# Patient Record
Sex: FEMALE | Race: WHITE | NOT HISPANIC OR LATINO | Employment: UNEMPLOYED | URBAN - METROPOLITAN AREA
[De-identification: names, ages, dates, MRNs, and addresses within clinical notes are randomized per-mention and may not be internally consistent; named-entity substitution may affect disease eponyms.]

---
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
## 2022-10-24 ENCOUNTER — OFFICE VISIT (OUTPATIENT)
Dept: BARIATRICS/WEIGHT MGMT | Facility: CLINIC | Age: 22
End: 2022-10-24

## 2022-10-24 VITALS
WEIGHT: 232 LBS | OXYGEN SATURATION: 98 % | RESPIRATION RATE: 18 BRPM | HEIGHT: 69 IN | BODY MASS INDEX: 34.36 KG/M2 | DIASTOLIC BLOOD PRESSURE: 90 MMHG | TEMPERATURE: 98.2 F | HEART RATE: 99 BPM | SYSTOLIC BLOOD PRESSURE: 136 MMHG

## 2022-10-24 DIAGNOSIS — R53.83 OTHER FATIGUE: ICD-10-CM

## 2022-10-24 DIAGNOSIS — F41.1 GENERALIZED ANXIETY DISORDER: ICD-10-CM

## 2022-10-24 DIAGNOSIS — F84.0 AUTISM SPECTRUM DISORDER: ICD-10-CM

## 2022-10-24 DIAGNOSIS — R03.0 ELEVATED BLOOD PRESSURE READING: ICD-10-CM

## 2022-10-24 DIAGNOSIS — E66.09 CLASS 1 OBESITY DUE TO EXCESS CALORIES WITHOUT SERIOUS COMORBIDITY WITH BODY MASS INDEX (BMI) OF 34.0 TO 34.9 IN ADULT: Primary | ICD-10-CM

## 2022-10-24 DIAGNOSIS — Z51.81 ENCOUNTER FOR THERAPEUTIC DRUG LEVEL MONITORING: ICD-10-CM

## 2022-10-24 DIAGNOSIS — F31.81 BIPOLAR II DISORDER: ICD-10-CM

## 2022-10-24 PROBLEM — F32.81 PMDD (PREMENSTRUAL DYSPHORIC DISORDER): Status: ACTIVE | Noted: 2021-10-19

## 2022-10-24 PROCEDURE — 99205 OFFICE O/P NEW HI 60 MIN: CPT | Performed by: NURSE PRACTITIONER

## 2022-10-24 RX ORDER — CHLORAL HYDRATE 500 MG
1000 CAPSULE ORAL 2 TIMES DAILY WITH MEALS
Start: 2022-10-24

## 2022-10-24 RX ORDER — MULTIPLE VITAMINS W/ MINERALS TAB 9MG-400MCG
1 TAB ORAL DAILY
Qty: 30 TABLET | Refills: 2
Start: 2022-10-24

## 2022-10-24 RX ORDER — LAMOTRIGINE 200 MG/1
200 TABLET ORAL
COMMUNITY
Start: 2022-06-07 | End: 2022-10-24 | Stop reason: SDUPTHER

## 2022-10-24 RX ORDER — BUSPIRONE HYDROCHLORIDE 15 MG/1
15 TABLET ORAL
COMMUNITY
Start: 2022-06-07 | End: 2022-11-07 | Stop reason: SDUPTHER

## 2022-10-24 NOTE — ASSESSMENT & PLAN NOTE
Patient's (Body mass index is 34.76 kg/m².) indicates that they are obese (BMI >30) with health conditions that include anxiety, elevated blood pressure reading, joint pain . Weight is newly identified. BMI is is above average; BMI management plan is completed. We discussed low calorie, low carb based diet program, portion control, increasing exercise, joining a fitness center or start home based exercise program, management of depression/anxiety/stress to control compensatory eating and an melania-based approach such as [employer] Pal or Lose It.    Topics of discussion included obesity as a disease, nutritional education on food groups, exercise, and medications. Patient was instructed in adequate protein, controlled carb and controlled fat intake.   Patient received instructions on using the medicines as a tool in controlling their weight with nutritional and behavioral changes. Risks and benefits were discussed. I believe the potential benefits of medication helping to decrease weight outweighs the risks. Patient is to try nutritonal/behavioral changes only first.   Patient received our clinic education booklet.   Our patient consent form was reviewed including potential risks of weight loss. We also reviewed our confidentiality and HIPPA statements. Patients current FITT score was reviewed along with current capability for exercise tolerance and a patient will work towards a FITT score of:     Frequency   Intensity Time Strength Training   []   0 None  []   0 None  []   0 None  []   0 None    []   1 (1-2x/week) []   1 (light) []   1 (<10 min) []   1 (1x/week)   [x]   2 (3-5x/week) [x]   2 (moderate) []   2 (10-20 min) [x]   2 (2x/week)   []   3 (daily)   []   3 (moderately hard)  []   4 (very hard) []   3 (20-30 min)  [x]   4 (>30 min) []   3 (3-4x/week)       Patient's past medical history was reviewed in detail and barriers to weight loss were identified and discussed. Past efforts at weight reduction on their own  as well as under physician supervision were documented and discussed.  I advised patient to continue routine care with their Primary Care Provider.     Nutritional recommendations and goals were reviewed including Calories:7998-8389 daily adjusted for exercise calories burnt, Protein:102-127 g daily, Net carbs (total carb - fiber) of 50-75g per day.  Start to keep a food journal and bring into next visit in 2 weeks for review. Practice the behavioral modification technique of mindful eating. Take one MVI daily and 2000mg fish oil daily. Take other medications and supplements as directed.  Complete labwork today.   Review brochure on saxenda.  Discuss future options of contrave, topamax, phentermine with psychiatrist.   Cut back on liquid calories (lattes and alcohol)  Cut back on carbs  Treatment goal 180-190lb.

## 2022-10-24 NOTE — ASSESSMENT & PLAN NOTE
Psychological condition is stable.  Regular aerobic exercise.  managed by psychiatry  Psychological condition  will be reassessed as indicated by eating behaviors.  Patient following up with psychiatry next week, sees therapist every 2 weeks for this and autism.   Patient does not feel like she gained weight with lamictal.

## 2022-10-24 NOTE — PROGRESS NOTES
[facility]  [address]  [place] [address]   Office Note      Date: 10/24/2022  Patient Name: Aurelia Crooks  MRN: [id number]  : 2000  Subjective  Subjective     Chief Complaint  Obesity Management consult, nutritional counseling          Aurelia Crooks presents to [facility] for obesity management. She is a self referral.  Has always struggled with weight. The last year to year and a half gained about 40lb, started college at 200lb. Attributes weight gain to self soothing with food, stress eating, late night eating as well. Also admits to more frequent THC use and eating more junk food. Weight has decreased by 13lb since stopping oral contraceptive about a month ago. Goal is to get back <200lb. Has a lot of joint pain in her knees since her gain in weight, works in  and standing long periods is hard for her. Also had COVID-19 2 months ago, blood pressure has increased since then. Gets winded walking long distances.   She is a college student studying psychology and art. Her diet varies. Skips breakfast or has coffee. Has two roommates.  L: on campus- $1 lunch protein, carb and veggie.   D: roommate cooks a lot (pot roast, chili) or pan seared chicken, a carb and a veggie  Eating a lot of snacks (bags of chips), sugar.  Feels anxious when she tries to cut back on sweets, thinks she has a carb addiction.  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  Also drinks on the weekends at a brewery 1 to 2 beers or a cocktail. Also knows she needs to eat out less.   She is walking a lot on campus- 12-15 minutes between locations.   Did cardio when she was younger- elliptical, stationary biking, treadmill walking. No strength training.   Bipolar: no change in weight when starting lamictal at age 14. Buspar is daily.   Blood pressure: observed by patient.   Weight  history:  Highest lifetime weight: 245 pounds. Today's weight is 105 kg (232 lb) pounds.   Weight 5 years ago: 190  The following seem to sabotage weight loss efforts:Lack of time for planning & self, comfort/stress eating, enjoyment of food, social events, eating late, waking up eating, liquid calories such as alcohol, mindless eating (snacking while working or watching TV), eating too fast, always hungry, boredom eating, specific cravings like carbohydrates and convenience food (fast food)  Family history:  Dad's side of the family has obesity  No diabetes or high blood pressure or cholesterol problems.     Review of Systems   Constitutional: Positive for fatigue and unexpected weight gain. Negative for activity change (denies slow movement).        Denies diminished sweating,  loss of appetite   HENT: Negative for facial swelling (deneis swelling of face and eyelids), trouble swallowing and voice change (denies hoarseness).         Denies tongue changes   Respiratory: Positive for shortness of breath. Negative for wheezing.         Denies difficulty breathing   Cardiovascular: Negative for chest pain, palpitations and leg swelling.   Gastrointestinal: Negative for abdominal pain, constipation, diarrhea, GERD and indigestion.   Endocrine: Positive for cold intolerance, heat intolerance and polyphagia. Negative for polydipsia and polyuria.        Denies dry, course skin, course hair, loss of hair, slowed movement   Genitourinary:        Denies excessive/painful menses   Musculoskeletal: Positive for arthralgias.        Denies exercise limitations  Denies chronic pain   Skin: Negative for dry skin.        Denies pale skin, brittle nails   Neurological: Negative for speech difficulty (denies slowed speech), weakness, headache and memory problem.        Negative for paresthesias   Psychiatric/Behavioral: Positive for depressed mood. Negative for self-injury, sleep disturbance and suicidal ideas. The patient is  "nervous/anxious.         Denies emotional instability       PHQ-9 Total Score: 16       Objective   Body mass index is 34.76 kg/m².  Body composition analysis completed and showed:   %body fat: 48.5    Measurements (in inches)  Neck: 15  Chest: 42.5  Waist:39  Hips: 48.5  Thighs: 49    Vital Signs:   /90 (BP Location: Left arm, Patient Position: Sitting)   Pulse 99   Temp 98.2 °F (36.8 °C)   Resp 18   Ht 174 cm (68.5\")   Wt 105 kg (232 lb)   SpO2 98%   BMI 34.76 kg/m²     Physical Exam   General appears stated age and normal appearance   HEENT PERRLA, EOM intact and conjunctivae normal   Chest/lungs Normal rate, Regular rhythm, Breathing is unlabored and Clear to auscultation bilaterally   Abdomen Soft, normal bowel sounds, without mass or tenderness and Central adiposity   Extremities without edema   Neuro Good historian and No focal deficit   Skin Warm, dry, intact   Psych normal behavior, normal thought content and normal concentration     Result Review :                   Assessment / Plan       Diagnoses and all orders for this visit:    1. Class 1 obesity due to excess calories without serious comorbidity with body mass index (BMI) of 34.0 to 34.9 in adult (Primary)  Assessment & Plan:  Patient's (Body mass index is 34.76 kg/m².) indicates that they are obese (BMI >30) with health conditions that include anxiety, elevated blood pressure reading, joint pain . Weight is newly identified. BMI is is above average; BMI management plan is completed. We discussed low calorie, low carb based diet program, portion control, increasing exercise, joining a fitness center or start home based exercise program, management of depression/anxiety/stress to control compensatory eating and an melania-based approach such as [employer] Pal or Lose It.    Topics of discussion included obesity as a disease, nutritional education on food groups, exercise, and medications. Patient was instructed in adequate protein, controlled " carb and controlled fat intake.   Patient received instructions on using the medicines as a tool in controlling their weight with nutritional and behavioral changes. Risks and benefits were discussed. I believe the potential benefits of medication helping to decrease weight outweighs the risks. Patient is to try nutritonal/behavioral changes only first.   Patient received our clinic education booklet.   Our patient consent form was reviewed including potential risks of weight loss. We also reviewed our confidentiality and HIPPA statements. Patients current FITT score was reviewed along with current capability for exercise tolerance and a patient will work towards a FITT score of:     Frequency   Intensity Time Strength Training   []   0 None  []   0 None  []   0 None  []   0 None    []   1 (1-2x/week) []   1 (light) []   1 (<10 min) []   1 (1x/week)   [x]   2 (3-5x/week) [x]   2 (moderate) []   2 (10-20 min) [x]   2 (2x/week)   []   3 (daily)   []   3 (moderately hard)  []   4 (very hard) []   3 (20-30 min)  [x]   4 (>30 min) []   3 (3-4x/week)       Patient's past medical history was reviewed in detail and barriers to weight loss were identified and discussed. Past efforts at weight reduction on their own as well as under physician supervision were documented and discussed.  I advised patient to continue routine care with their Primary Care Provider.     Nutritional recommendations and goals were reviewed including Calories:3170-9964 daily adjusted for exercise calories burnt, Protein:102-127 g daily, Net carbs (total carb - fiber) of 50-75g per day.  Start to keep a food journal and bring into next visit in 2 weeks for review. Practice the behavioral modification technique of mindful eating. Take one MVI daily and 2000mg fish oil daily. Take other medications and supplements as directed.  Complete labwork today.   Review brochure on saxenda.  Discuss future options of contrave, topamax, phentermine with  psychiatrist.   Cut back on liquid calories (lattes and alcohol)  Cut back on carbs  Treatment goal 180-190lb.       Orders:  -     CBC (No Diff)  -     Insulin, Total  -     Comprehensive Metabolic Panel  -     Lipid Panel  -     TSH  -     Hemoglobin A1c  -     Calcitriol (1,25 di-OH Vitamin D)  -     multivitamin with minerals (Multivitamin Adults) tablet tablet; Take 1 tablet by mouth Daily.  Dispense: 30 tablet; Refill: 2  -     Omega-3 Fatty Acids (fish oil) 1000 MG capsule capsule; Take 1 capsule by mouth 2 (Two) Times a Day With Meals.    2. Generalized anxiety disorder  Assessment & Plan:  Psychological condition is improving since the spring.  Regular aerobic exercise.  Continue with psychiatry/psychology  Psychological condition  will be reassessed at the next regular appointment.  Avoid THC due to ^hunger and lower inhibition (and history of weight gain while using more frequently).     Orders:  -     Comprehensive Metabolic Panel    3. Elevated blood pressure reading  Assessment & Plan:  Newly identified. Will monitor. Should improve with 5-10% weight reduction.       4. Bipolar II disorder (HCC)  Assessment & Plan:  Psychological condition is stable.  Regular aerobic exercise.  managed by psychiatry  Psychological condition  will be reassessed as indicated by eating behaviors.  Patient following up with psychiatry next week, sees therapist every 2 weeks for this and autism.   Patient does not feel like she gained weight with lamictal.       5. Autism spectrum disorder    6. Other fatigue  Assessment & Plan:  Per patient this is a facet of her autism spectrum disorder.     Orders:  -     CBC (No Diff)  -     TSH  -     Calcitriol (1,25 di-OH Vitamin D)    7. Encounter for therapeutic drug level monitoring  -     Urine Drug Screen - Urine, Clean Catch       I spent 75 minutes caring for Aurelia on this date of service. This time includes time spent by me in the following activities:preparing for the  visit, obtaining and/or reviewing a separately obtained history, performing a medically appropriate examination and/or evaluation , counseling and educating the patient/family/caregiver, ordering medications, tests, or procedures and documenting information in the medical record  Follow Up   Return in about 2 weeks (around 11/7/2022) for Next scheduled follow up, Labs this visit.  Patient was given instructions and counseling regarding her condition or for health maintenance advice. Please see specific information pulled into the AVS if appropriate.     Yue Steele, APRN  10/24/2022

## 2022-10-24 NOTE — ASSESSMENT & PLAN NOTE
Psychological condition is improving since the spring.  Regular aerobic exercise.  Continue with psychiatry/psychology  Psychological condition  will be reassessed at the next regular appointment.  Avoid THC due to ^hunger and lower inhibition (and history of weight gain while using more frequently).

## 2022-10-26 LAB
1,25(OH)2D SERPL-MCNC: 30.5 PG/ML (ref 24.8–81.5)
ALBUMIN SERPL-MCNC: 4.7 G/DL (ref 3.9–5)
ALBUMIN/GLOB SERPL: 1.8 {RATIO} (ref 1.2–2.2)
ALP SERPL-CCNC: 60 IU/L (ref 44–121)
ALT SERPL-CCNC: 9 IU/L (ref 0–32)
AMPHETAMINES UR QL SCN: NEGATIVE NG/ML
AST SERPL-CCNC: 14 IU/L (ref 0–40)
BARBITURATES UR QL SCN: NEGATIVE NG/ML
BENZODIAZ UR QL SCN: NEGATIVE NG/ML
BILIRUB SERPL-MCNC: 0.5 MG/DL (ref 0–1.2)
BUN SERPL-MCNC: 7 MG/DL (ref 6–20)
BUN/CREAT SERPL: 10 (ref 9–23)
BZE UR QL SCN: NEGATIVE NG/ML
CALCIUM SERPL-MCNC: 9.8 MG/DL (ref 8.7–10.2)
CANNABINOIDS UR QL SCN: NEGATIVE NG/ML
CHLORIDE SERPL-SCNC: 100 MMOL/L (ref 96–106)
CHOLEST SERPL-MCNC: 189 MG/DL (ref 100–199)
CO2 SERPL-SCNC: 22 MMOL/L (ref 20–29)
CREAT SERPL-MCNC: 0.71 MG/DL (ref 0.57–1)
CREAT UR-MCNC: 146.2 MG/DL (ref 20–300)
EGFRCR SERPLBLD CKD-EPI 2021: 124 ML/MIN/1.73
ERYTHROCYTE [DISTWIDTH] IN BLOOD BY AUTOMATED COUNT: 12.8 % (ref 11.7–15.4)
GLOBULIN SER CALC-MCNC: 2.6 G/DL (ref 1.5–4.5)
GLUCOSE SERPL-MCNC: 83 MG/DL (ref 70–99)
HBA1C MFR BLD: 5.4 % (ref 4.8–5.6)
HCT VFR BLD AUTO: 37.8 % (ref 34–46.6)
HDLC SERPL-MCNC: 82 MG/DL
HGB BLD-MCNC: 12.3 G/DL (ref 11.1–15.9)
INSULIN SERPL-ACNC: 6.1 UIU/ML (ref 2.6–24.9)
LABORATORY COMMENT REPORT: NORMAL
LDLC SERPL CALC-MCNC: 98 MG/DL (ref 0–99)
MCH RBC QN AUTO: 28 PG (ref 26.6–33)
MCHC RBC AUTO-ENTMCNC: 32.5 G/DL (ref 31.5–35.7)
MCV RBC AUTO: 86 FL (ref 79–97)
METHADONE UR QL SCN: NEGATIVE NG/ML
OPIATES UR QL SCN: NEGATIVE NG/ML
OXYCODONE+OXYMORPHONE UR QL SCN: NEGATIVE NG/ML
PCP UR QL: NEGATIVE NG/ML
PH UR: 6.3 [PH] (ref 4.5–8.9)
PLATELET # BLD AUTO: 330 X10E3/UL (ref 150–450)
POTASSIUM SERPL-SCNC: 4.6 MMOL/L (ref 3.5–5.2)
PROPOXYPH UR QL SCN: NEGATIVE NG/ML
PROT SERPL-MCNC: 7.3 G/DL (ref 6–8.5)
RBC # BLD AUTO: 4.39 X10E6/UL (ref 3.77–5.28)
SODIUM SERPL-SCNC: 139 MMOL/L (ref 134–144)
TRIGL SERPL-MCNC: 45 MG/DL (ref 0–149)
TSH SERPL DL<=0.005 MIU/L-ACNC: 1.59 UIU/ML (ref 0.45–4.5)
VLDLC SERPL CALC-MCNC: 9 MG/DL (ref 5–40)
WBC # BLD AUTO: 6.4 X10E3/UL (ref 3.4–10.8)

## 2022-11-07 ENCOUNTER — OFFICE VISIT (OUTPATIENT)
Dept: BARIATRICS/WEIGHT MGMT | Facility: CLINIC | Age: 22
End: 2022-11-07

## 2022-11-07 VITALS
HEART RATE: 92 BPM | OXYGEN SATURATION: 100 % | BODY MASS INDEX: 34.88 KG/M2 | SYSTOLIC BLOOD PRESSURE: 112 MMHG | DIASTOLIC BLOOD PRESSURE: 84 MMHG | HEIGHT: 69 IN | WEIGHT: 235.5 LBS

## 2022-11-07 DIAGNOSIS — E66.09 CLASS 2 OBESITY DUE TO EXCESS CALORIES WITHOUT SERIOUS COMORBIDITY WITH BODY MASS INDEX (BMI) OF 35.0 TO 35.9 IN ADULT: Primary | ICD-10-CM

## 2022-11-07 DIAGNOSIS — R11.0 NAUSEA: ICD-10-CM

## 2022-11-07 DIAGNOSIS — E55.9 VITAMIN D INSUFFICIENCY: ICD-10-CM

## 2022-11-07 PROCEDURE — 99215 OFFICE O/P EST HI 40 MIN: CPT | Performed by: NURSE PRACTITIONER

## 2022-11-07 RX ORDER — ERGOCALCIFEROL 1.25 MG/1
50000 CAPSULE ORAL WEEKLY
Qty: 8 CAPSULE | Refills: 0 | Status: SHIPPED | OUTPATIENT
Start: 2022-11-07 | End: 2023-01-03

## 2022-11-07 RX ORDER — ONDANSETRON 4 MG/1
4 TABLET, ORALLY DISINTEGRATING ORAL EVERY 8 HOURS PRN
Qty: 14 TABLET | Refills: 0 | Status: SHIPPED | OUTPATIENT
Start: 2022-11-07

## 2022-11-07 RX ORDER — BLOOD SUGAR DIAGNOSTIC
1 STRIP MISCELLANEOUS DAILY
Qty: 30 EACH | Refills: 2 | Status: SHIPPED | OUTPATIENT
Start: 2022-11-07

## 2022-11-07 RX ORDER — BUSPIRONE HYDROCHLORIDE 30 MG/1
TABLET ORAL
COMMUNITY
Start: 2022-10-31

## 2022-11-07 NOTE — PROGRESS NOTES
[facility]  [address]  [place] [address]     Office Note      Date: 2022  Patient Name: Aurelia Crooks  MRN: [id number]  : 2000  Subjective  Subjective     Chief Complaint  Obesity Management follow-up          Aurelia Crooks presents to [facility] for obesity management. This is her initial 2 week follow up. She is working on the following lifestyle changes: she is tracking her carbohydrate intake some. Got her peroid last week and that messed with her cravings and she ate more carbs. Did keep a food journal for one day, has a hard time sticking with changes. She did start at the gym.   Patient is unsatisfied with weight loss progress. Appetite is moderately controlled. She is interested in trying saxenda. Discussed other medications with her psychiatrist and she agreed that these are not ideal options with her current mental health diagnosis. The patient is taking multivitamin and is taking fish oil.  The patient is using a food journal.    The patient is exercising, started back at the gym, has been 5 times since her last visit, did cardio for 30 minutes on the [place]. 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 FITT score of:    Frequency Intensity Time Strength Training   []   0, none []   0 []   0 [x]   0   []   1 (1-2x/week) [x]   1 (light) []   1 (<10 min) []   1 (1x/week)   [x]   2 (3-5x/week) []   2 (moderate) []   2 (10-20 min) []   2 (2x/week)   []   3 (daily) []   3 (moderately hard)  []   4 (very hard) []   3 (20-30 min)  [x]   4 (>30 min) []   3 (3-4x/week)     Review of Systems   Constitutional: Negative for appetite change and fatigue.   Eyes: Negative for blurred vision, double vision and visual disturbance.   Cardiovascular: Negative for chest pain and palpitations.   Gastrointestinal: Negative for abdominal pain, constipation, diarrhea, nausea, vomiting and GERD.   Endocrine:  "Negative for polydipsia, polyphagia and polyuria.   Musculoskeletal: Negative for arthralgias, back pain and myalgias.   Neurological: Negative for dizziness, tremors, light-headedness, headache and memory problem.        Parasthesias negative   Psychiatric/Behavioral: Negative for sleep disturbance, depressed mood and stress. The patient is not nervous/anxious.        Objective   Start weight: 232 pounds.    Total Loss lb/%Loss of beginning body weight (BBW): +3.5lb/+1.51%  Change in weight since last visit: +3.5    Body mass index is 35.29 kg/m².   Body composition analysis completed and showed:   %body fat: 49.1  Measurements (in inches)  Waist: 38.75    Vital Signs:   /84   Pulse 92   Ht 174 cm (68.5\")   Wt 107 kg (235 lb 8 oz)   SpO2 100%   BMI 35.29 kg/m²     Physical Exam   General appears stated age and normal appearance   HEENT PERRLA, EOM intact and conjunctivae normal   Chest/lungs Normal rate, Regular rhythm and Breathing is unlabored   Extremities without edema   Neuro Good historian and No focal deficit   Skin Warm, dry, intact   Psych normal behavior, normal thought content and normal concentration     Result Review :   The following data was reviewed by: BREONNA Dodd on 11/07/2022:  Labs reviewed:  Blood count within normal limits  No signs of insulin resistance (glucose, insulin, A1c all in normal limits)  Chemistry panel unremarkable  Cholesterol within normal limits  Thyroid normal and ideal  Vitamin D borderline low, consider prescription dosing.   UDS reviewed and consistent with prescribed medications.   Common labs    Common Labs 10/24/22 10/24/22 10/24/22 10/24/22    1403 1403 1403 1403   Glucose  83     BUN  7     Creatinine  0.71     Sodium  139     Potassium  4.6     Chloride  100     Calcium  9.8     Total Protein  7.3     Albumin  4.7     Total Bilirubin  0.5     Alkaline Phosphatase  60     AST (SGOT)  14     ALT (SGPT)  9     WBC 6.4      Hemoglobin 12.3    "   Hematocrit 37.8      Platelets 330      Total Cholesterol   189    Triglycerides   45    HDL Cholesterol   82    LDL Cholesterol    98    Hemoglobin A1C    5.4      Comments are available for some flowsheets but are not being displayed.                    Assessment / Plan        Diagnoses and all orders for this visit:    1. Class 2 obesity due to excess calories without serious comorbidity with body mass index (BMI) of 35.0 to 35.9 in adult (Primary)  Assessment & Plan:  Patient's (Body mass index is 35.29 kg/m².) indicates that they are morbidly obese (BMI > 40 or > 35 with obesity - related health condition) with health conditions that include anxiety, vitamin D deficiency . Weight is unchanged. BMI is is above average; BMI management plan is completed. We discussed low calorie, low carb based diet program, portion control, increasing exercise, joining a fitness center or start home based exercise program, management of depression/anxiety/stress to control compensatory eating, pharmacologic options including saxenda and an melania-based approach such as [employer] Pal or Lose It.    I have instructed the patient to continue with pursuit of medical weight loss as a part of this program. Patient does meet criteria for use of anorectics at this time as is not at treatment goal and BMI >30.     Continue nutritional focus and work towards new exercise FITT goal of: 2-2-4-2.   The current plan for this month includes: continue to work on lifestyle behavioral changes- add regular aerobic exercise and resistance training. Continue to work on food journal and mindfulness, bring to next visit.    Start Saxenda. Denies family or personal history of pancreatitis, MTC, or MEN 2. Discussed common side effects of nausea, diarrhea, vomiting, constipation, stomach pain, headache, fatigue, upset stomach, dizziness, feeling bloated, belching, gas, stomach flu, heartburn.            Orders:  -     Liraglutide (SAXENDA) 18 MG/3ML  injection pen; Inject 3 mg under the skin into the appropriate area as directed Daily. BMI 35.29. PA will be completed by prescriber's office.  Dispense: 15 mL; Refill: 0  -     Insulin Pen Needle (Pen Needles) 32G X 6 MM misc; 1 dose Daily. For use with saxenda.  Dispense: 30 each; Refill: 2    2. Vitamin D insufficiency  Assessment & Plan:  Start treatment of one capsule weekly for eight weeks. Reassess next visit.     Orders:  -     vitamin D (ERGOCALCIFEROL) 1.25 MG (15478 UT) capsule capsule; Take 1 capsule by mouth 1 (One) Time Per Week.  Dispense: 8 capsule; Refill: 0    3. Nausea  -     ondansetron ODT (ZOFRAN-ODT) 4 MG disintegrating tablet; Place 1 tablet on the tongue Every 8 (Eight) Hours As Needed for Nausea or Vomiting.  Dispense: 14 tablet; Refill: 0      I spent 44 minutes caring for Aurelia on this date of service. This time includes time spent by me in the following activities:preparing for the visit, reviewing tests, obtaining and/or reviewing a separately obtained history, performing a medically appropriate examination and/or evaluation , counseling and educating the patient/family/caregiver, ordering medications, tests, or procedures, documenting information in the medical record and independently interpreting results and communicating that information with the patient/family/caregiver  Follow Up   Return in about 4 weeks (around 12/5/2022) for Next scheduled follow up.  Patient was given instructions and counseling regarding her condition or for health maintenance advice. Please see specific information pulled into the AVS if appropriate.     Yue Steele, APRN  11/07/2022

## 2022-11-08 NOTE — ASSESSMENT & PLAN NOTE
Patient's (Body mass index is 35.29 kg/m².) indicates that they are morbidly obese (BMI > 40 or > 35 with obesity - related health condition) with health conditions that include anxiety, vitamin D deficiency . Weight is unchanged. BMI is is above average; BMI management plan is completed. We discussed low calorie, low carb based diet program, portion control, increasing exercise, joining a fitness center or start home based exercise program, management of depression/anxiety/stress to control compensatory eating, pharmacologic options including saxenda and an melania-based approach such as [employer] Pal or Lose It.    I have instructed the patient to continue with pursuit of medical weight loss as a part of this program. Patient does meet criteria for use of anorectics at this time as is not at treatment goal and BMI >30.     Continue nutritional focus and work towards new exercise FITT goal of: 2-2-4-2.   The current plan for this month includes: continue to work on lifestyle behavioral changes- add regular aerobic exercise and resistance training. Continue to work on food journal and mindfulness, bring to next visit.    Start Saxenda. Denies family or personal history of pancreatitis, MTC, or MEN 2. Discussed common side effects of nausea, diarrhea, vomiting, constipation, stomach pain, headache, fatigue, upset stomach, dizziness, feeling bloated, belching, gas, stomach flu, heartburn.

## 2022-11-18 ENCOUNTER — PRIOR AUTHORIZATION (OUTPATIENT)
Dept: BARIATRICS/WEIGHT MGMT | Facility: CLINIC | Age: 22
End: 2022-11-18

## 2022-11-18 NOTE — TELEPHONE ENCOUNTER
VISHNU MCFADDEN (Walls: [id number])  Rx #: [id number]  Saxenda 18MG/3ML pen-injectors    Approved 11/18/2022 to 03/18/2023

## 2022-12-28 DIAGNOSIS — E55.9 VITAMIN D INSUFFICIENCY: ICD-10-CM

## 2022-12-28 NOTE — TELEPHONE ENCOUNTER
Rx Refill Note  Requested Prescriptions     Pending Prescriptions Disp Refills   • vitamin D (ERGOCALCIFEROL) 1.25 MG (70640 UT) capsule capsule [Pharmacy Med Name: VITAMIN D2 50,000IU (ERGO) CAP RX] 8 capsule 0     Sig: TAKE 1 CAPSULE BY MOUTH 1 TIME EVERY WEEK      Last office visit with prescribing clinician: 11/7/2022   Last telemedicine visit with prescribing clinician: Visit date not found   Next office visit with prescribing clinician: Visit date not found                         Would you like a call back once the refill request has been completed: [] Yes [] No    If the office needs to give you a call back, can they leave a voicemail: [] Yes [] No    Malena Babcock MA  12/28/22, 14:02 EST

## 2023-01-03 RX ORDER — ERGOCALCIFEROL 1.25 MG/1
CAPSULE ORAL
Qty: 8 CAPSULE | Refills: 0 | Status: SHIPPED | OUTPATIENT
Start: 2023-01-03